# Patient Record
Sex: FEMALE | Race: WHITE | Employment: FULL TIME | URBAN - METROPOLITAN AREA
[De-identification: names, ages, dates, MRNs, and addresses within clinical notes are randomized per-mention and may not be internally consistent; named-entity substitution may affect disease eponyms.]

---

## 2020-03-05 ENCOUNTER — HOSPITAL ENCOUNTER (EMERGENCY)
Facility: CLINIC | Age: 50
Discharge: HOME | End: 2020-03-05
Attending: EMERGENCY MEDICINE | Admitting: EMERGENCY MEDICINE
Payer: COMMERCIAL

## 2020-03-05 ENCOUNTER — APPOINTMENT (OUTPATIENT)
Dept: MRI IMAGING | Facility: CLINIC | Age: 50
End: 2020-03-05
Attending: EMERGENCY MEDICINE
Payer: COMMERCIAL

## 2020-03-05 VITALS
TEMPERATURE: 98.1 F | HEART RATE: 64 BPM | SYSTOLIC BLOOD PRESSURE: 138 MMHG | WEIGHT: 190 LBS | BODY MASS INDEX: 29.82 KG/M2 | DIASTOLIC BLOOD PRESSURE: 91 MMHG | HEIGHT: 67 IN | OXYGEN SATURATION: 98 % | RESPIRATION RATE: 16 BRPM

## 2020-03-05 DIAGNOSIS — E87.6 HYPOKALEMIA: ICD-10-CM

## 2020-03-05 DIAGNOSIS — G51.0 PARTIAL FACIAL NERVE PALSY: ICD-10-CM

## 2020-03-05 LAB
ANION GAP SERPL CALCULATED.3IONS-SCNC: 5 MMOL/L (ref 3–14)
APTT PPP: 29 SEC (ref 22–37)
BASOPHILS # BLD AUTO: 0 10E9/L (ref 0–0.2)
BASOPHILS NFR BLD AUTO: 0.7 %
BUN SERPL-MCNC: 11 MG/DL (ref 7–30)
CALCIUM SERPL-MCNC: 8.8 MG/DL (ref 8.5–10.1)
CHLORIDE SERPL-SCNC: 106 MMOL/L (ref 94–109)
CO2 SERPL-SCNC: 27 MMOL/L (ref 20–32)
CREAT SERPL-MCNC: 0.78 MG/DL (ref 0.52–1.04)
DIFFERENTIAL METHOD BLD: NORMAL
EOSINOPHIL # BLD AUTO: 0.2 10E9/L (ref 0–0.7)
EOSINOPHIL NFR BLD AUTO: 3.7 %
ERYTHROCYTE [DISTWIDTH] IN BLOOD BY AUTOMATED COUNT: 11.9 % (ref 10–15)
GFR SERPL CREATININE-BSD FRML MDRD: 89 ML/MIN/{1.73_M2}
GLUCOSE BLDC GLUCOMTR-MCNC: 99 MG/DL (ref 70–99)
GLUCOSE SERPL-MCNC: 101 MG/DL (ref 70–99)
HCT VFR BLD AUTO: 43.8 % (ref 35–47)
HGB BLD-MCNC: 14.4 G/DL (ref 11.7–15.7)
IMM GRANULOCYTES # BLD: 0 10E9/L (ref 0–0.4)
IMM GRANULOCYTES NFR BLD: 0.2 %
INR PPP: 0.95 (ref 0.86–1.14)
INTERPRETATION ECG - MUSE: NORMAL
LYMPHOCYTES # BLD AUTO: 1.8 10E9/L (ref 0.8–5.3)
LYMPHOCYTES NFR BLD AUTO: 32.8 %
MCH RBC QN AUTO: 30.4 PG (ref 26.5–33)
MCHC RBC AUTO-ENTMCNC: 32.9 G/DL (ref 31.5–36.5)
MCV RBC AUTO: 93 FL (ref 78–100)
MONOCYTES # BLD AUTO: 0.5 10E9/L (ref 0–1.3)
MONOCYTES NFR BLD AUTO: 8.6 %
NEUTROPHILS # BLD AUTO: 3 10E9/L (ref 1.6–8.3)
NEUTROPHILS NFR BLD AUTO: 54 %
NRBC # BLD AUTO: 0 10*3/UL
NRBC BLD AUTO-RTO: 0 /100
PLATELET # BLD AUTO: 248 10E9/L (ref 150–450)
POTASSIUM SERPL-SCNC: 3.1 MMOL/L (ref 3.4–5.3)
RBC # BLD AUTO: 4.73 10E12/L (ref 3.8–5.2)
SODIUM SERPL-SCNC: 138 MMOL/L (ref 133–144)
TROPONIN I SERPL-MCNC: <0.015 UG/L (ref 0–0.04)
WBC # BLD AUTO: 5.5 10E9/L (ref 4–11)

## 2020-03-05 PROCEDURE — 84484 ASSAY OF TROPONIN QUANT: CPT | Performed by: EMERGENCY MEDICINE

## 2020-03-05 PROCEDURE — 00000146 ZZHCL STATISTIC GLUCOSE BY METER IP

## 2020-03-05 PROCEDURE — 85730 THROMBOPLASTIN TIME PARTIAL: CPT | Performed by: EMERGENCY MEDICINE

## 2020-03-05 PROCEDURE — 99285 EMERGENCY DEPT VISIT HI MDM: CPT | Mod: 25

## 2020-03-05 PROCEDURE — 85610 PROTHROMBIN TIME: CPT | Performed by: EMERGENCY MEDICINE

## 2020-03-05 PROCEDURE — 70553 MRI BRAIN STEM W/O & W/DYE: CPT

## 2020-03-05 PROCEDURE — 25000132 ZZH RX MED GY IP 250 OP 250 PS 637: Performed by: EMERGENCY MEDICINE

## 2020-03-05 PROCEDURE — A9585 GADOBUTROL INJECTION: HCPCS | Performed by: EMERGENCY MEDICINE

## 2020-03-05 PROCEDURE — 85025 COMPLETE CBC W/AUTO DIFF WBC: CPT | Performed by: EMERGENCY MEDICINE

## 2020-03-05 PROCEDURE — 80048 BASIC METABOLIC PNL TOTAL CA: CPT | Performed by: EMERGENCY MEDICINE

## 2020-03-05 PROCEDURE — 25500064 ZZH RX 255 OP 636: Performed by: EMERGENCY MEDICINE

## 2020-03-05 PROCEDURE — 93005 ELECTROCARDIOGRAM TRACING: CPT

## 2020-03-05 RX ORDER — PREDNISONE 20 MG/1
60 TABLET ORAL DAILY
Qty: 21 TABLET | Refills: 0 | Status: SHIPPED | OUTPATIENT
Start: 2020-03-05 | End: 2020-03-12

## 2020-03-05 RX ORDER — POTASSIUM CHLORIDE 1.5 G/1.58G
40 POWDER, FOR SOLUTION ORAL ONCE
Status: COMPLETED | OUTPATIENT
Start: 2020-03-05 | End: 2020-03-05

## 2020-03-05 RX ORDER — GADOBUTROL 604.72 MG/ML
10 INJECTION INTRAVENOUS ONCE
Status: COMPLETED | OUTPATIENT
Start: 2020-03-05 | End: 2020-03-05

## 2020-03-05 RX ORDER — POTASSIUM CHLORIDE 1.5 G/1.58G
20 POWDER, FOR SOLUTION ORAL 2 TIMES DAILY
Qty: 6 PACKET | Refills: 0 | Status: SHIPPED | OUTPATIENT
Start: 2020-03-05 | End: 2020-03-08

## 2020-03-05 RX ORDER — METOPROLOL TARTRATE 50 MG
50 TABLET ORAL ONCE
Status: COMPLETED | OUTPATIENT
Start: 2020-03-05 | End: 2020-03-05

## 2020-03-05 RX ORDER — VALACYCLOVIR HYDROCHLORIDE 1 G/1
1000 TABLET, FILM COATED ORAL 3 TIMES DAILY
Qty: 4 TABLET | Refills: 0 | Status: SHIPPED | OUTPATIENT
Start: 2020-03-05 | End: 2020-03-12

## 2020-03-05 RX ADMIN — POTASSIUM CHLORIDE 40 MEQ: 1.5 POWDER, FOR SOLUTION ORAL at 09:21

## 2020-03-05 RX ADMIN — GADOBUTROL 8 ML: 604.72 INJECTION INTRAVENOUS at 08:02

## 2020-03-05 RX ADMIN — METOPROLOL TARTRATE 50 MG: 50 TABLET, FILM COATED ORAL at 09:22

## 2020-03-05 ASSESSMENT — ENCOUNTER SYMPTOMS
TROUBLE SWALLOWING: 0
SORE THROAT: 0
HEADACHES: 0
CHILLS: 0
NAUSEA: 0
VOMITING: 0
NUMBNESS: 1
FEVER: 0
DIARRHEA: 0
SHORTNESS OF BREATH: 0

## 2020-03-05 ASSESSMENT — MIFFLIN-ST. JEOR: SCORE: 1511.52

## 2020-03-05 NOTE — ED TRIAGE NOTES
Pt comes in for rt side facial numbness starting this morning. Pt states she did not noticed it right away when she woke up, but shortly after. Yesterday pt states her right eye was bothering her and everything had a strange metallic taste. Pt denies numbness or weakness in arms or legs.

## 2020-03-05 NOTE — ED AVS SNAPSHOT
[facility]  [address]  [facility] [address]  Phone:  [phone]  Fax:  [phone]                                    Sarika Turner   MRN: [id number]    Department:  [facility]   Date of Visit:  3/5/2020           After Visit Summary Signature Page    I have received my discharge instructions, and my questions have been answered. I have discussed any challenges I see with this plan with the nurse or doctor.    ..........................................................................................................................................  Patient/Patient Representative Signature      ..........................................................................................................................................  Patient Representative Print Name and Relationship to Patient    ..................................................               ................................................  Date                                   Time    ..........................................................................................................................................  Reviewed by Signature/Title    ...................................................              ..............................................  Date                                               Time          22EPIC Rev 08/18

## 2020-03-05 NOTE — ED PROVIDER NOTES
History     Chief Complaint:  Numbness      HPI   Sarika Turner is a 49 year old female who presents with right sided facial numbness. The patient says that she has been having a strange metallic taste in her mouth since yesterday. She says that last night it felt like her mouth was burnt, but it looked normal. This morning she woke up at 0645 with her mouth feeling the same, and around 0700 she noticed that the right side of her face was numb. She denies any fever, sore throat, chills, cough, nausea, vomiting, diarrhea, headache, vision changes, hearing changes, or any difficulties breathing or swallowing.     Allergies:  No Known Drug Allergies     Medications:    Micro-K  Percocet  Tamiflu  Norethindrone acetate  Metoprolol tartrate  Ibuprofen  Hydrochlorothiazide     Past Medical History:    Anemia  Hypertension   Hypokalemia  Leiomyoma  of uterus     Past Surgical History:    Operative hysteroscopy with morcellator  Dilate cervix, ablate endometrium novasure, combined  Cholecystectomy     Family History:    Family history reviewed. No pertinent family history.     Social History:  The patient was accompanied to the ED by friend.  Smoking Status: Never Smoker  Alcohol Use: Positive  PCP: Shahla Maradiaga   Marital Status:  Single      Review of Systems   Constitutional: Negative for chills and fever.   HENT: Negative for hearing loss, sore throat and trouble swallowing.         Metallic taste in mouth   Eyes: Negative for visual disturbance.   Respiratory: Negative for shortness of breath.    Gastrointestinal: Negative for diarrhea, nausea and vomiting.   Neurological: Positive for numbness. Negative for headaches.   All other systems reviewed and are negative.      Physical Exam     Patient Vitals for the past 24 hrs:   BP Temp Temp src Pulse Resp SpO2 Height Weight   03/05/20 0836 (!) 150/91 -- -- 67 -- 100 % -- --   03/05/20 0740 (!) 157/109 -- -- 87 -- -- -- --   03/05/20 0733 (!) 159/102 98.1  F  "(36.7  C) Oral 76 18 100 % 1.689 m (5' 6.5\") 86.2 kg (190 lb)        Physical Exam    Constitutional: Alert, attentive, GCS 15  HENT:    Nose: Nose normal.    Mouth/Throat: Oropharynx is clear, mucous membranes are moist  Eyes: EOM are normal, anicteric, conjugate gaze  CV: regular rate and rhythm; no murmurs  Chest: Effort normal and breath sounds clear without wheezing or rales, symmetric bilaterally   GI:  non tender. No distension. No guarding or rebound.    MSK: No LE edema, no tenderness to palpation of BLE.  Neurological: Very faint flattening of the right nasolabial fold with slow but fully closing blinking of the right eye, forehead spared extraocular movements full, tongue midline.   strength 5 out of 5 bilaterally, arm flexion extension 5 out of 5 bilaterally, steady gait with 5 out of 5 lower extremity strength.  Skin: Skin is warm and dry.    Emergency Department Course     ECG:  ECG taken at 0744, ECG read at 0746  Normal sinus rhythm  Normal  ECG  Rate 83 bpm. MA interval 136 ms. QRS duration 96 ms. QT/QTc 378/444 ms. P-R-T axes 43 47 35.    Imaging:  Radiology findings were communicated with the patient who voiced understanding of the findings.    MR Brain w/o & w Contrast  Normal MRI of the brain.   ABELINO TAM MD  Reading per radiology    Laboratory:  Laboratory findings were communicated with the patient who voiced understanding of the findings.    Glucose by meter: 99    CBC: WBC 5.5, HGB 14.4,   BMP: potassium 3.1 (L),  (H) o/w WNL (Creatinine 0.78)  INR: 0.95  PTT: 29  Troponin (Collected 0745): <0.015     Interventions:  0921 Klor-con 40 mEq Oral   Lopressor 50 mg Oral     Emergency Department Course:    0739 Nursing notes and vitals reviewed. I performed an exam of the patient as documented above. Code Stroke called.     0745 IV was inserted and blood was drawn for laboratory testing, results above.     0749 I spoke with Dr. Ngo of the stroke neurology service regarding " patient's presentation, findings, and plan of care.    0752 Patient rechecked and updated.       0801 The patient was sent for a MR while in the emergency department, results above.      0930 Patient rechecked and updated.       The patient is discharged to home.     Impression & Plan      Medical Decision Making:  Sarika Turner is a 49 year old female with a history significant for oral herpes presenting for evaluation of mild Juan abnormal taste as well as slight weakness of her right lower face however her forehead is spared at this time.  Given her mild symptoms, she would be an NIH of 1, not amenable to TPA and lower suspicion given her age and only risk factor of hypertension however stroke code was called given she would be in the window with TPA.  In discussion with Dr. graves, stroke neurologist, he agreed that TPA would not be offered given her mild symptoms and to proceed directly with MRI.  Fortunately this was negative for stroke.  Her exam I feel is therefore consistent with early likely partial facial palsy especially given recent herpes outbreak.  No evidence of shingles at this time she does not have any pain or numbness, no Bryant sign.  I will initiate patient on prednisone as well as Valtrex, I recommended close follow-up with both PCP and in neurology.  No indication for eyelid taping or ophthalmology follow-up as of yet.  I did caution her that she should return emergency department should she have new or worsening symptoms especially those associated with difficulty closing her eye, other localizing weakness or numbness, fever or rash.  Otherwise she should follow-up with PCP and neurology.  She is on hydrochlorothiazide, she did have mild hypokalemia however this is not likely the etiology.  I recommended short burst of additional potassium and then resumption of her 10 mEq twice daily and follow-up with PCP for recheck.        Diagnosis:    ICD-10-CM    1. 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 Hypokalemia E87.6      Disposition:   Findings and plan explained to the Patient. Patient discharged home with instructions regarding supportive care, medications, and reasons to return. The importance of close follow-up was reviewed.     Discharge Medications:  New Prescriptions    POTASSIUM CHLORIDE (KLOR-CON) 20 MEQ PACKET    Take 20 mEq by mouth 2 times daily for 3 days    PREDNISONE (DELTASONE) 20 MG TABLET    Take 3 tablets (60 mg) by mouth daily for 7 days Take two tablets (= 40mg) each day for 5 (five) days    VALACYCLOVIR (VALTREX) 1000 MG TABLET    Take 1 tablet (1,000 mg) by mouth 3 times daily for 7 days     Claus Cannon MD  [employer]  11:10 AM 03/05/20       Scribe Disclosure:  I, Oswaldo Pennington, am serving as a scribe at 7:40 AM on 3/5/2020 to document services personally performed by Claus Cannon MD based on my observations and the provider's statements to me.    [facility] EMERGENCY DEPARTMENT       Claus Cannon MD  03/05/20 9155

## 2020-03-05 NOTE — DISCHARGE INSTRUCTIONS
You should take the medications as prescribed.  You could have some progression of your symptoms including the moving your right forehead as well as trouble closing her eye.  If you have trouble closing her eye and have some eye discomfort you should apply hydrating/moisturizing eyedrops and should consider gently taping her eye close especially at sleep.  If you have significant eye pain or difficulty closing you should return to the emergency department.  I do recommend you take the prednisone and Valtrex as prescribed these can help shorten the course however it does take time for the nerve to heal.  Should you have increasing pain, develop a rash or sores on your face you should also return to the emergency department as this could be signs of shingles.  I recommend you make an appointment to follow-up both with neurology and with your primary doctor and continue to take your potassium.

## 2020-03-05 NOTE — ED NOTES
Assisted with patient triage, Applied monitoring devices (EKG, BP, and pulse ox) onto Patient. Placed Pt. On portable monitor for transport

## 2020-10-20 ENCOUNTER — TELEPHONE (OUTPATIENT)
Dept: ORTHOPEDICS | Facility: CLINIC | Age: 50
End: 2020-10-20

## 2020-10-20 NOTE — TELEPHONE ENCOUNTER
Received medication refill request addressed / faxed to Dr. James Cannon from [facility] for medication Valacyclovir. Date last filled 3/5/20.    Upon chart review, patient has not been seen by Dr. James Cannon.    Patient was seen by Dr. Claus Cannon on 3/5/20 at [facility] ED.    Will notify pharmacy that Rx request was sent to incorrect provider.    Marilyn Schulz, ATC

## 2020-10-20 NOTE — TELEPHONE ENCOUNTER
Notified pharmacy that Rx was not prescribed by Dr. James Cannon.    Closing encounter.    Marilyn Schulz, ATC

## 2022-06-12 ENCOUNTER — HEALTH MAINTENANCE LETTER (OUTPATIENT)
Age: 52
End: 2022-06-12

## 2022-10-23 ENCOUNTER — HEALTH MAINTENANCE LETTER (OUTPATIENT)
Age: 52
End: 2022-10-23

## 2023-06-18 ENCOUNTER — HEALTH MAINTENANCE LETTER (OUTPATIENT)
Age: 53
End: 2023-06-18

## 2025-05-09 ENCOUNTER — HOSPITAL ENCOUNTER (EMERGENCY)
Facility: CLINIC | Age: 55
Discharge: HOME | End: 2025-05-09
Attending: EMERGENCY MEDICINE | Admitting: EMERGENCY MEDICINE
Payer: COMMERCIAL

## 2025-05-09 VITALS
OXYGEN SATURATION: 96 % | WEIGHT: 205.69 LBS | HEART RATE: 77 BPM | RESPIRATION RATE: 14 BRPM | TEMPERATURE: 97.1 F | HEIGHT: 67 IN | BODY MASS INDEX: 32.28 KG/M2 | SYSTOLIC BLOOD PRESSURE: 151 MMHG | DIASTOLIC BLOOD PRESSURE: 94 MMHG
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
## 2025-05-09 DIAGNOSIS — T18.128A FOOD IMPACTION OF ESOPHAGUS, INITIAL ENCOUNTER: ICD-10-CM

## 2025-05-09 PROCEDURE — 250N000013 HC RX MED GY IP 250 OP 250 PS 637: Performed by: EMERGENCY MEDICINE

## 2025-05-09 PROCEDURE — 99284 EMERGENCY DEPT VISIT MOD MDM: CPT

## 2025-05-09 PROCEDURE — 96372 THER/PROPH/DIAG INJ SC/IM: CPT | Performed by: EMERGENCY MEDICINE

## 2025-05-09 PROCEDURE — 250N000011 HC RX IP 250 OP 636: Mod: JZ | Performed by: EMERGENCY MEDICINE

## 2025-05-09 RX ORDER — OMEPRAZOLE 20 MG/1
20 CAPSULE, DELAYED RELEASE ORAL DAILY
Qty: 30 CAPSULE | Refills: 0 | Status: SHIPPED | OUTPATIENT
Start: 2025-05-09 | End: 2025-06-08

## 2025-05-09 RX ADMIN — ANTACID/ANTIFLATULENT 4 G: 380; 550; 10; 10 GRANULE, EFFERVESCENT ORAL at 13:11

## 2025-05-09 RX ADMIN — GLUCAGON 0.5 MG: KIT at 13:11

## 2025-05-09 ASSESSMENT — COLUMBIA-SUICIDE SEVERITY RATING SCALE - C-SSRS
1. IN THE PAST MONTH, HAVE YOU WISHED YOU WERE DEAD OR WISHED YOU COULD GO TO SLEEP AND NOT WAKE UP?: NO
2. HAVE YOU ACTUALLY HAD ANY THOUGHTS OF KILLING YOURSELF IN THE PAST MONTH?: NO
6. HAVE YOU EVER DONE ANYTHING, STARTED TO DO ANYTHING, OR PREPARED TO DO ANYTHING TO END YOUR LIFE?: NO

## 2025-05-09 ASSESSMENT — ACTIVITIES OF DAILY LIVING (ADL)
ADLS_ACUITY_SCORE: 41
ADLS_ACUITY_SCORE: 41

## 2025-05-09 NOTE — ED PROVIDER NOTES
Emergency Department Note      History of Present Illness     Chief Complaint   Food Bolus      HPI   Sarika Turner is a 54 year old female who feels that she may have an esophageal food impaction.  She was eating brisket a day and a half ago and she felt that it was caught in the mid to upper esophagus.  Since then she has had difficulty and has not been able to eat a solid foods.  Occasionally she has been able to tolerate very small amounts of water but other times will vomit when she tries to drink water.  She says she has had this happen numerous times in the past but has never been seen by GI for an endoscopy.  She says at times she gets some GERD symptoms but not consistently.    Past Medical History     Medical History and Problem List   Past Medical History:   Diagnosis Date    Acne     Constipation     Fe deficiency anemia     Hypertension     Hypokalemia     Leiomyoma of uterus        Medications   omeprazole (PRILOSEC) 20 MG DR capsule  Ferrous Sulfate (IRON SUPPLEMENT PO)  hydrochlorothiazide 12.5 MG TABS  ibuprofen (ADVIL,MOTRIN) 600 MG tablet  METOPROLOL TARTRATE PO  NORETHINDRONE ACETATE PO  Oseltamivir Phosphate (TAMIFLU PO)  oxyCODONE-acetaminophen (PERCOCET) 5-325 MG per tablet  potassium chloride (MICRO-K) 10 MEQ CR capsule  valACYclovir (VALTREX) 1000 mg tablet        Surgical History   Past Surgical History:   Procedure Laterality Date    CHOLECYSTECTOMY      DILATE CERVIX, ABLATE ENDOMETRIUM NOVASURE, COMBINED N/A 3/2/2016    Procedure: COMBINED DILATE CERVIX, ABLATE ENDOMETRIUM NOVASURE;  Surgeon: Navya Hancock MD;  Location: [facility]    OPERATIVE HYSTEROSCOPY WITH MORCELLATOR N/A 3/2/2016    Procedure: OPERATIVE HYSTEROSCOPY WITH MORCELLATOR (MYOSURE);  Surgeon: Navya Hancock MD;  Location: [facility]       Physical Exam     Patient Vitals for the past 24 hrs:   BP Temp Temp src Pulse Resp SpO2 Height Weight   05/09/25 1428 (!) 151/94 -- -- 77 14 96 % -- --   05/09/25 1025 (!)  "158/100 97.1  F (36.2  C) Temporal 82 18 98 % 1.702 m (5' 7\") 93.3 kg (205 lb 11 oz)     Physical Exam  Constitutional:       General: She is not in acute distress.     Appearance: Normal appearance. She is not diaphoretic.   HENT:      Head: Atraumatic.      Right Ear: External ear normal.      Left Ear: External ear normal.      Mouth/Throat:      Mouth: Mucous membranes are moist.   Eyes:      General: No scleral icterus.     Conjunctiva/sclera: Conjunctivae normal.   Cardiovascular:      Rate and Rhythm: Normal rate and regular rhythm.      Heart sounds: Normal heart sounds.   Pulmonary:      Effort: No respiratory distress.      Breath sounds: Normal breath sounds.   Abdominal:      General: Abdomen is flat. There is no distension.      Tenderness: There is no abdominal tenderness.   Musculoskeletal:      Cervical back: Neck supple.   Skin:     General: Skin is warm.   Neurological:      General: No focal deficit present.      Mental Status: She is alert and oriented to person, place, and time.   Psychiatric:         Mood and Affect: Mood normal.         Behavior: Behavior normal.           ED Course      Medications Administered   Medications   sod bicarbonate-citric acid-simethicone (EZ GAS) 2.21-1.53-0.04 g packet 4 g (4 g Oral $Given 5/9/25 1311)   glucagon injection 0.5 mg (0.5 mg Subcutaneous $Given 5/9/25 1311)       Medical Decision Making / Diagnosis     JONATHAN Turner is a 54 year old female who presents to the ED with concern for an esophageal food impaction.  She has intermittently been able to tolerate small amounts of fluids but no food for 36 hours.  She received EZ gas and glucagon without any complication.    She was able to pass the obstruction and is now drinking fluids without any difficulty.  Her description of her symptoms recurrent over time are suspicious for potentially a stricture or obstruction of the esophagus.  She is considering seeing gastroenterology in the past but is " never made an appointment.  She was given follow-up information.  She will start on omeprazole.    Disposition   The patient was discharged.     Diagnosis     ICD-10-CM    1. Food impaction of esophagus, initial encounter  T18.128A     [id number].F3XA            Discharge Medications   Discharge Medication List as of 5/9/2025  2:25 PM        START taking these medications    Details   omeprazole (PRILOSEC) 20 MG DR capsule Take 1 capsule (20 mg) by mouth daily., Disp-30 capsule, R-0, E-Prescribe                     Binu Francis MD  05/09/25 0733

## 2025-05-09 NOTE — DISCHARGE INSTRUCTIONS
You should plan to take a soft diet for the next 3 to 4 days.    Return to the ER for increased pain, difficulty breathing or swallowing, or any new concerns.

## 2025-05-09 NOTE — ED TRIAGE NOTES
Pt arrives with c/o brisket stuck in lower throat since Wednesday night. Pt reports that this has happened before and she has tried everything without complete success. Pt reports she was able to get a good size piece out but still cannot tolerate food or drink. No airway compromise in triage.     Triage Assessment (Adult)       Row Name 05/09/25 1024          Triage Assessment    Airway WDL WDL        Respiratory WDL    Respiratory WDL WDL        Skin Circulation/Temperature WDL    Skin Circulation/Temperature WDL WDL        Cardiac WDL    Cardiac WDL WDL        Peripheral/Neurovascular WDL    Peripheral Neurovascular WDL WDL        Cognitive/Neuro/Behavioral WDL    Cognitive/Neuro/Behavioral WDL WDL

## 2025-08-17 ENCOUNTER — HEALTH MAINTENANCE LETTER (OUTPATIENT)
Age: 55
End: 2025-08-17